# Patient Record
Sex: MALE | Race: BLACK OR AFRICAN AMERICAN | NOT HISPANIC OR LATINO | URBAN - METROPOLITAN AREA
[De-identification: names, ages, dates, MRNs, and addresses within clinical notes are randomized per-mention and may not be internally consistent; named-entity substitution may affect disease eponyms.]

---

## 2017-02-13 ENCOUNTER — EMERGENCY (EMERGENCY)
Facility: HOSPITAL | Age: 32
LOS: 0 days | Discharge: HOME | End: 2017-02-13

## 2017-06-27 DIAGNOSIS — M25.512 PAIN IN LEFT SHOULDER: ICD-10-CM

## 2019-07-31 ENCOUNTER — EMERGENCY (EMERGENCY)
Facility: HOSPITAL | Age: 34
LOS: 1 days | Discharge: HOME | End: 2019-07-31
Attending: EMERGENCY MEDICINE | Admitting: EMERGENCY MEDICINE
Payer: MEDICAID

## 2019-07-31 VITALS
WEIGHT: 125 LBS | TEMPERATURE: 98 F | HEART RATE: 90 BPM | RESPIRATION RATE: 18 BRPM | OXYGEN SATURATION: 97 % | SYSTOLIC BLOOD PRESSURE: 124 MMHG | DIASTOLIC BLOOD PRESSURE: 85 MMHG

## 2019-07-31 PROCEDURE — 99283 EMERGENCY DEPT VISIT LOW MDM: CPT

## 2019-07-31 RX ORDER — ACETAMINOPHEN 500 MG
650 TABLET ORAL ONCE
Refills: 0 | Status: COMPLETED | OUTPATIENT
Start: 2019-07-31 | End: 2019-07-31

## 2019-07-31 RX ORDER — KETOROLAC TROMETHAMINE 30 MG/ML
15 SYRINGE (ML) INJECTION ONCE
Refills: 0 | Status: DISCONTINUED | OUTPATIENT
Start: 2019-07-31 | End: 2019-07-31

## 2019-07-31 RX ADMIN — Medication 650 MILLIGRAM(S): at 17:20

## 2019-07-31 NOTE — ED PROVIDER NOTE - ENMT, MLM
Abrasion over forehead with dried blood. No skull depression palpable, no raccoon eyes, no christine signs, no laceration, Airway patent, Nasal mucosa clear. Mouth with normal mucosa. Throat has no vesicles, no oropharyngeal exudates and uvula is midline.

## 2019-07-31 NOTE — ED ADULT NURSE NOTE - CHPI ED NUR SYMPTOMS NEG
no seizure/no chest wall tenderness/no back pain/no weakness/no loss of consciousness/no blurred vision/no change in level of consciousness/no chest pain/no vomiting

## 2019-07-31 NOTE — ED PROVIDER NOTE - CHPI ED SYMPTOMS NEG
no back pain/no blurred vision/no change in level of consciousness/no loss of consciousness/no seizure/no weakness/no vomiting/no chest wall tenderness
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
## 2019-07-31 NOTE — ED PROVIDER NOTE - CLINICAL SUMMARY MEDICAL DECISION MAKING FREE TEXT BOX
No anticoagulation use, no LOC, [place] and N[facility]US negative for CT imaging, and patient's tetanus is UTD. Reassurance and return precautions for concussion given. Pain control advised.

## 2019-07-31 NOTE — ED PROVIDER NOTE - NSFOLLOWUPINSTRUCTIONS_ED_ALL_ED_FT
[facility]    Post-Concussion Syndrome  Image   Post-concussion syndrome is when symptoms last longer than normal after a head injury.    What are the signs or symptoms?  After a head injury, you may:  Have headaches.  Feel tired.  Feel dizzy.  Feel weak.  Have trouble seeing.  Have trouble in bright lights.  Have trouble hearing.  Not be able to remember things.  Not be able to focus.  Have trouble sleeping.  Have mood swings.  Have trouble learning new things.  These can last from weeks to months.    Follow these instructions at home:  Medicines     Take all medicines only as told by your doctor.  Do not take prescription pain medicines.  Activity     Limit activities as told by your doctor. This includes:  Homework.  Job-related work.  Thinking.  Watching TV.  Using a computer or phone.  Puzzles.  Exercise.  Sports.  Slowly return to your normal activity as told by your doctor.  Stop an activity if you have symptoms.  Do not do anything that may cause you to get injured again.  General instructions     Rest. Try to:  Sleep 7–9 hours each night.  Take naps or breaks when you feel tired during the day.  Do not drink alcohol until your doctor says that you can.  Keep track of your symptoms.  Keep all follow-up visits as told by your doctor. This is important.  Contact a doctor if:  You do not improve.  You get worse.  You have another injury.  Get help right away if:  You have a very bad headache.  You feel confused.  You feel very sleepy.  You pass out (faint).  You throw up (vomit).  You feel weak in any part of your body.  You feel numb in any part of your body.  You start shaking (have a seizure).  You have trouble talking.  Summary  Post-concussion syndrome is when symptoms last longer than normal after a head injury.  Limit all activity after your injury. Gradually return to normal activity as told by your doctor.  Rest, do not drink alcohol, and avoid prescription pain medicines after a concussion.  Call your doctor if your symptoms get worse.  This information is not intended to replace advice given to you by your health care provider. Make sure you discuss any questions you have with your health care provider.    Document Released: 01/25/2006 Document Revised: 01/22/2019 Document Reviewed: 01/22/2019  [employer] Patient Education © 2019 [employer]

## 2019-07-31 NOTE — ED PROVIDER NOTE - ENMT NEGATIVE STATEMENT, MLM
dried blood over forehead 2/2 abrasion with no laceration noted. Ears: no ear pain and no hearing problems.Nose: no nasal congestion and no nasal drainage.Mouth/Throat: no dysphagia, no hoarseness and no throat pain.Neck: no lumps, no pain, no stiffness and no swollen glands.
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
## 2019-08-06 DIAGNOSIS — S00.81XA ABRASION OF OTHER PART OF HEAD, INITIAL ENCOUNTER: ICD-10-CM
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
## 2023-11-05 ENCOUNTER — EMERGENCY (EMERGENCY)
Facility: HOSPITAL | Age: 38
LOS: 1 days | Discharge: HOME | End: 2023-11-05
Admitting: EMERGENCY MEDICINE
Payer: MEDICAID

## 2023-11-05 VITALS
OXYGEN SATURATION: 98 % | HEART RATE: 68 BPM | RESPIRATION RATE: 16 BRPM | TEMPERATURE: 98 F | SYSTOLIC BLOOD PRESSURE: 116 MMHG | DIASTOLIC BLOOD PRESSURE: 73 MMHG | WEIGHT: 119.93 LBS | HEIGHT: 69 IN

## 2023-11-05 DIAGNOSIS — A53.9 SYPHILIS, UNSPECIFIED: ICD-10-CM

## 2023-11-05 DIAGNOSIS — N48.5 ULCER OF PENIS: ICD-10-CM

## 2023-11-05 PROCEDURE — 99284 EMERGENCY DEPT VISIT MOD MDM: CPT

## 2023-11-05 RX ORDER — CEFTRIAXONE 500 MG/1
500 INJECTION, POWDER, FOR SOLUTION INTRAMUSCULAR; INTRAVENOUS ONCE
Refills: 0 | Status: COMPLETED | OUTPATIENT
Start: 2023-11-05 | End: 2023-11-05

## 2023-11-05 RX ORDER — AZITHROMYCIN 500 MG/1
1000 TABLET, FILM COATED ORAL ONCE
Refills: 0 | Status: COMPLETED | OUTPATIENT
Start: 2023-11-05 | End: 2023-11-05

## 2023-11-05 RX ORDER — PENICILLIN G BENZATHINE 1200000 [IU]/2ML
2.4 INJECTION, SUSPENSION INTRAMUSCULAR ONCE
Refills: 0 | Status: COMPLETED | OUTPATIENT
Start: 2023-11-05 | End: 2023-11-05

## 2023-11-05 RX ADMIN — CEFTRIAXONE 500 MILLIGRAM(S): 500 INJECTION, POWDER, FOR SOLUTION INTRAMUSCULAR; INTRAVENOUS at 22:55

## 2023-11-05 RX ADMIN — PENICILLIN G BENZATHINE 2.4 MILLION UNIT(S): 1200000 INJECTION, SUSPENSION INTRAMUSCULAR at 22:55

## 2023-11-05 RX ADMIN — AZITHROMYCIN 1000 MILLIGRAM(S): 500 TABLET, FILM COATED ORAL at 22:55

## 2023-11-05 NOTE — ED PROVIDER NOTE - PATIENT PORTAL LINK FT
You can access the FollowMyHealth Patient Portal offered by [facility] by registering at the following website: [email or web address]. By joining [employer]’s FollowMyHealth portal, you will also be able to view your health information using other applications (apps) compatible with our system.

## 2023-11-05 NOTE — ED ADULT TRIAGE NOTE - CHIEF COMPLAINT QUOTE
Pt walked into ER c/o pain with urination x2 days. Pt denies abnormal bladder movements or associated complaints at triage. NKDA/-PMH.

## 2023-11-05 NOTE — ED ADULT NURSE NOTE - OBJECTIVE STATEMENT
Patient presents with complaints of pain with urination for 2 days. H/o HIV. Denies blood in urine, fever, chills, difficulty urinating.

## 2023-11-05 NOTE — ED PROVIDER NOTE - CLINICAL SUMMARY MEDICAL DECISION MAKING FREE TEXT BOX
Patient here with painless canker sore tip of the penis asking for testing and treatment.  Physical exam is suspicious for syphilis.  We will test and treat and have patient follow-up with urology.

## 2023-11-05 NOTE — ED PROVIDER NOTE - OBJECTIVE STATEMENT
38-year-old male coming in stating he has nonpainful ulcers on his penis for the last several days.  No other complaints.  Appears well no acute distress

## 2023-11-06 PROBLEM — T14.8XXA OTHER INJURY OF UNSPECIFIED BODY REGION, INITIAL ENCOUNTER: Chronic | Status: ACTIVE | Noted: 2019-07-31
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
## 2023-11-06 LAB
C TRACH RRNA SPEC QL NAA+PROBE: SIGNIFICANT CHANGE UP
C TRACH RRNA SPEC QL NAA+PROBE: SIGNIFICANT CHANGE UP
N GONORRHOEA RRNA SPEC QL NAA+PROBE: SIGNIFICANT CHANGE UP
N GONORRHOEA RRNA SPEC QL NAA+PROBE: SIGNIFICANT CHANGE UP

## 2023-11-07 LAB
RPR SER-TITR: (no result)
RPR SER-TITR: (no result)
RPR SERPL-ACNC: REACTIVE
RPR SERPL-ACNC: REACTIVE
T PALLIDUM AB TITR SER: POSITIVE
T PALLIDUM AB TITR SER: POSITIVE

## 2024-01-22 NOTE — ED ADULT NURSE NOTE - SKIN TURGOR
[FreeTextEntry1] : Hypertension-continue lisinopril 40 mg daily.  Hyperlipidemia-he stopped taking Lipitor number months ago because concerned about side effects.  He was not experiencing any side effects. Repeat fasting labs/lipid profile were sent.  Chronic GERD/gastritis-presently on Prevacid 30 mg daily.  He does need GI follow-up for this as well as colonoscopy.  Roqrxqvxqnz-aqkrmo-lw A1c was sent.  Follow-up PSA for prostate cancer screening was sent.  History of colon polyps-we discussed the need to follow-up with his gastroenterologist because he is most likely due for follow-up study.  Sinusitis-start Omnicef 600 mg daily for 1 week. resilient/elastic

## 2024-03-28 NOTE — ED PROVIDER NOTE - EYES, MLM
Robotic assisted laparoscopic cholecystectomy Clear bilaterally, pupils equal, round and reactive to light.

## 2024-06-06 ENCOUNTER — EMERGENCY (EMERGENCY)
Facility: HOSPITAL | Age: 39
LOS: 1 days | Discharge: HOME | End: 2024-06-06
Attending: EMERGENCY MEDICINE | Admitting: EMERGENCY MEDICINE
Payer: MEDICAID

## 2024-06-06 VITALS
OXYGEN SATURATION: 98 % | WEIGHT: 119.93 LBS | RESPIRATION RATE: 16 BRPM | HEART RATE: 72 BPM | SYSTOLIC BLOOD PRESSURE: 129 MMHG | TEMPERATURE: 98 F | HEIGHT: 69 IN | DIASTOLIC BLOOD PRESSURE: 88 MMHG

## 2024-06-06 VITALS
TEMPERATURE: 98 F | DIASTOLIC BLOOD PRESSURE: 81 MMHG | HEART RATE: 69 BPM | OXYGEN SATURATION: 96 % | SYSTOLIC BLOOD PRESSURE: 125 MMHG | RESPIRATION RATE: 16 BRPM

## 2024-06-06 DIAGNOSIS — L53.9 ERYTHEMATOUS CONDITION, UNSPECIFIED: ICD-10-CM

## 2024-06-06 DIAGNOSIS — L02.31 CUTANEOUS ABSCESS OF BUTTOCK: ICD-10-CM

## 2024-06-06 PROCEDURE — 99283 EMERGENCY DEPT VISIT LOW MDM: CPT

## 2024-06-06 RX ORDER — IBUPROFEN 200 MG
600 TABLET ORAL ONCE
Refills: 0 | Status: COMPLETED | OUTPATIENT
Start: 2024-06-06 | End: 2024-06-06

## 2024-06-06 RX ORDER — CEPHALEXIN 500 MG
500 CAPSULE ORAL ONCE
Refills: 0 | Status: COMPLETED | OUTPATIENT
Start: 2024-06-06 | End: 2024-06-06

## 2024-06-06 RX ORDER — IBUPROFEN 200 MG
1 TABLET ORAL
Qty: 28 | Refills: 0
Start: 2024-06-06 | End: 2024-06-12

## 2024-06-06 RX ORDER — CEPHALEXIN 500 MG
1 CAPSULE ORAL
Qty: 14 | Refills: 0
Start: 2024-06-06 | End: 2024-06-12

## 2024-06-06 RX ADMIN — Medication 500 MILLIGRAM(S): at 05:17

## 2024-06-06 RX ADMIN — Medication 1 TABLET(S): at 05:17

## 2024-06-06 RX ADMIN — Medication 600 MILLIGRAM(S): at 05:17

## 2024-06-06 NOTE — ED PROVIDER NOTE - PROGRESS NOTE DETAILS
Patient to be started on double antibiotics the region after Emla application open at this time does not require incision and drainage requested that he have his doctor see him for wound follow-up in 48 hours or return immediately to the emergency room for evaluation

## 2024-06-06 NOTE — ED PROVIDER NOTE - SKIN WOUND TYPE
Circular discrete region of erythema on the right lateral buttock with mild tenderness to palpation no induration no fluctuance.  Lateral to this there is a another region of erythema with a central head with pustule and surrounding erythema with mild induration no fluctuance no streaking no crepitus tender to palpation

## 2024-06-06 NOTE — ED PROVIDER NOTE - OBJECTIVE STATEMENT
39-year-old male no past medical history, who presents with pain and redness to right lateral buttock which appeared 2 days ago and now worsening.  Patient suspects possible infection.  Denies trauma to that region denies shaving.  Denies fever chills or any other abscess elsewhere.  Denies history of abscesses in the past

## 2024-06-06 NOTE — ED PROVIDER NOTE - PATIENT PORTAL LINK FT
You can access the FollowMyHealth Patient Portal offered by [facility] by registering at the following website: [email or web address]. By joining [employer]’s FollowMyHealth portal, you will also be able to view your health information using other applications (apps) compatible with our system.

## 2024-06-06 NOTE — ED PROVIDER NOTE - CLINICAL SUMMARY MEDICAL DECISION MAKING FREE TEXT BOX
39-year-old male no past medical history, who presents with pain and redness to right lateral buttock which appeared 2 days ago and now worsening.  Patient suspects possible infection.  Denies trauma to that region denies shaving.  Denies fever chills or any other abscess elsewhere.  Denies history of abscesses in the past    There are 2 discrete circular regions of erythema over the right lateral buttocks 1 which is not fluctuant open not actively draining tender to palpation with no induration, the second does have a central head with very mild induration underneath no streaking no crepitus no vesicles or lesions.  With Emla applied with Tegaderm central head opened patient advised warm compresses to return in 48 hours for wound check he also has a primary care.  I do not suspect abscess elsewhere skin is otherwise intact no streaking, will start him on Bactrim and Keflex and ibuprofen with strict return precautions,

## 2024-09-23 ENCOUNTER — EMERGENCY (EMERGENCY)
Facility: HOSPITAL | Age: 39
LOS: 1 days | Discharge: LEFT BEFORE DISCHARGE | End: 2024-09-23
Attending: EMERGENCY MEDICINE | Admitting: EMERGENCY MEDICINE
Payer: MEDICAID

## 2024-09-23 VITALS
HEART RATE: 59 BPM | WEIGHT: 115.96 LBS | OXYGEN SATURATION: 99 % | RESPIRATION RATE: 16 BRPM | TEMPERATURE: 99 F | SYSTOLIC BLOOD PRESSURE: 114 MMHG | HEIGHT: 66 IN | DIASTOLIC BLOOD PRESSURE: 80 MMHG

## 2024-09-23 PROCEDURE — 99283 EMERGENCY DEPT VISIT LOW MDM: CPT

## 2024-09-23 RX ORDER — ACETAMINOPHEN 325 MG/1
650 TABLET ORAL ONCE
Refills: 0 | Status: COMPLETED | OUTPATIENT
Start: 2024-09-23 | End: 2024-09-23

## 2024-09-23 NOTE — ED PROVIDER NOTE - CLINICAL SUMMARY MEDICAL DECISION MAKING FREE TEXT BOX
Patient with no significant past medical history presents with weeks to months of right second toe pain related to overgrown toenail.  On exam, patient has onychomycosis and particularly overgrown right second toenail with tenderness to palpation of toe.  X-ray negative for fracture.  No evidence of cellulitis clinically.  Will treat pain and refer to podiatry. Patient with no significant past medical history presents with weeks to months of right second toe pain related to overgrown toenail.  On exam, patient has onychomycosis and particularly overgrown right second toenail with tenderness to palpation of toe.   No evidence of cellulitis clinically.  Will check xr, treat pain and refer to podiatry.    Pt eloped prior to xray or receiving paperwork with referral.

## 2024-09-23 NOTE — ED PROVIDER NOTE - PHYSICAL EXAMINATION
Constitutional: awake and alert, in no acute distress  HEENT: head normocephalic and atraumatic. moist mucous membranes  Eyes: extraocular movements intact, normal conjunctiva  Neck: supple, normal ROM  Cardiovascular: regular rate   Pulmonary: no respiratory distress  Gastrointestinal: abdomen flat and nondistended  Skin: warm, dry, normal for ethnicity  Musculoskeletal: Onychomycosis of all toenails, right second toe with long thick toenail, tenderness to palpation of toe, no open wounds  Neurological: oriented x4, no focal neurologic deficit.   Psychiatric: calm and cooperative

## 2024-09-23 NOTE — ED PROVIDER NOTE - OBJECTIVE STATEMENT
Patient with no significant past medical history presents with right second toe pain for weeks to months.  States that his toenail is overgrown and he does not have nail clippers at home.  Has never seen a podiatrist.  No trauma to toe.  No fevers or chills.

## 2024-09-23 NOTE — ED PROVIDER NOTE - NSFOLLOWUPINSTRUCTIONS_ED_ALL_ED_FT
Fungal Nail Infection  A fungal nail infection is a common infection of the toenails or fingernails. This condition affects toenails more often than fingernails. It often affects the great, or big, toes. More than one nail may be infected. The condition can be passed from person to person (is contagious).    What are the causes?  This condition is caused by a fungus, such as yeast or molds. Several types of fungi can cause the infection. These fungi are common in moist and warm areas. If your hands or feet come into contact with the fungus, it may get into a crack in your fingernail or toenail or in the surrounding skin, and cause an infection.    What increases the risk?  The following factors may make you more likely to develop this condition:  Being of older age.  Having certain medical conditions, such as:  Athlete's foot.  Diabetes.  Poor circulation.  A weak body defense system (immune system).  Walking barefoot in areas where the fungus thrives, such as showers or locker rooms.  Wearing shoes and socks that cause your feet to sweat.  Having a nail injury or a recent nail surgery.  What are the signs or symptoms?  Symptoms of this condition include:  A pale spot on the nail.  Thickening of the nail.  A nail that becomes yellow, brown, or white.  A brittle or ragged nail edge.  A nail that has lifted away from the nail bed.  How is this diagnosed?  This condition is diagnosed with a physical exam. Your health care provider may take a scraping or clipping from your nail to test for the fungus.    How is this treated?  Medicine being applied to a fungal nail infection.   Treatment is not needed for mild infections. If you have significant nail changes, treatment may include:  Antifungal medicines taken by mouth (orally). You may need to take the medicine for several weeks or several months, and you may not see the results for a long time. These medicines can cause side effects. Ask your health care provider what problems to watch for.  Antifungal nail polish or nail cream. These may be used along with oral antifungal medicines.  Laser treatment of the nail.  Surgery to remove the nail. This may be needed for the most severe infections.  It can take a long time, usually up to a year, for the infection to go away. The infection may also come back.    Follow these instructions at home:  Medicines    Take or apply over-the-counter and prescription medicines only as told by your health care provider.  Ask your health care provider about using over-the-counter mentholated ointment on your nails.  Nail care    Trim your nails often.  Wash and dry your hands and feet every day.  Keep your feet dry. To do this:  Wear absorbent socks, and change your socks frequently.  Wear shoes that allow air to circulate, such as sandals or canvas tennis shoes. Throw out old shoes.  If you go to a nail salon, make sure you choose one that uses clean instruments.  Use antifungal foot powder on your feet and in your shoes.  General instructions    Do not share personal items, such as towels or nail clippers.  Do not walk barefoot in shower rooms or locker rooms.  Wear rubber gloves if you are working with your hands in wet areas.  Keep all follow-up visits. This is important.  Contact a health care provider if:  You have redness, pain, or pus near the toenail or fingernail.  Your infection is not getting better, or it is getting worse after several months.  You have more circulation problems near the toenail or fingernail.  You have brown or black discoloration of the nail that spreads to the surrounding skin.  Summary  A fungal nail infection is a common infection of the toenails or fingernails.  Treatment is not needed for mild infections. If you have significant nail changes, treatment may include taking medicine orally and applying medicine to your nails.  It can take a long time, usually up to a year, for the infection to go away. The infection may also come back.  Take or apply over-the-counter and prescription medicines only as told by your health care provider.  This information is not intended to replace advice given to you by your health care provider. Make sure you discuss any questions you have with your health care provider.

## 2024-09-23 NOTE — ED PROVIDER NOTE - CARE PROVIDER_API CALL
Ty Grissom  Podiatric Medicine and Surgery  [address] [place] [address], [address]  [place] [address]  Phone: [phone]  Fax: [phone]  Follow Up Time:

## 2024-09-23 NOTE — ED ADULT NURSE NOTE - CCCP TRG CHIEF CMPLNT
O2 sats 89% and 91%on room air at rest and with walking checked . Updated Dr. Gonzáles and made orders. AVS reviewed to pt and spouse and agreed to the discharge plan. Wheeled out by aide on discharge.                          toe pain

## 2024-09-26 DIAGNOSIS — B35.1 TINEA UNGUIUM: ICD-10-CM

## 2024-09-26 DIAGNOSIS — Z53.29 PROCEDURE AND TREATMENT NOT CARRIED OUT BECAUSE OF PATIENT'S DECISION FOR OTHER REASONS: ICD-10-CM

## 2024-09-26 DIAGNOSIS — M79.674 PAIN IN RIGHT TOE(S): ICD-10-CM

## 2024-09-30 NOTE — ED PROVIDER NOTE - IV ALTEPLASE EXCL ABS HIDDEN
Evolved after procedure, as evidenced by fever 102.4, tachycardia, with suspected source of infection complicated UTI as above.  Sepsis workup ordered - WBC 10.02, procalcitonin 0.46, lactic acid 1.1  On IV cefepime and Diflucan  9/27 - Patient afebrile and no longer tachycardic   BCx-NG 72H/UCX-mixed contaminants  Gentle IV hydration  Monitor vitals for fever recurrence   show

## 2024-10-13 ENCOUNTER — EMERGENCY (EMERGENCY)
Facility: HOSPITAL | Age: 39
LOS: 1 days | Discharge: HOME | End: 2024-10-13
Attending: EMERGENCY MEDICINE | Admitting: EMERGENCY MEDICINE
Payer: MEDICAID

## 2024-10-13 VITALS
OXYGEN SATURATION: 97 % | HEART RATE: 96 BPM | HEIGHT: 66 IN | TEMPERATURE: 99 F | SYSTOLIC BLOOD PRESSURE: 145 MMHG | RESPIRATION RATE: 18 BRPM | DIASTOLIC BLOOD PRESSURE: 87 MMHG | WEIGHT: 119.93 LBS
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
## 2024-10-13 DIAGNOSIS — R07.89 OTHER CHEST PAIN: ICD-10-CM

## 2024-10-13 DIAGNOSIS — M79.671 PAIN IN RIGHT FOOT: ICD-10-CM

## 2024-10-13 DIAGNOSIS — S90.31XA CONTUSION OF RIGHT FOOT, INITIAL ENCOUNTER: ICD-10-CM
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
## 2024-10-13 PROCEDURE — 99284 EMERGENCY DEPT VISIT MOD MDM: CPT

## 2024-10-13 PROCEDURE — 73630 X-RAY EXAM OF FOOT: CPT | Mod: 26,RT

## 2024-10-13 RX ORDER — ACETAMINOPHEN 325 MG
650 TABLET ORAL ONCE
Refills: 0 | Status: COMPLETED | OUTPATIENT
Start: 2024-10-13 | End: 2024-10-13

## 2024-10-13 RX ADMIN — Medication 600 MILLIGRAM(S): at 23:03

## 2024-10-13 RX ADMIN — Medication 650 MILLIGRAM(S): at 23:03

## 2024-10-13 NOTE — ED ADULT NURSE NOTE - IN THE PAST 12 MONTHS HAVE YOU USED DRUGS OTHER THAN THOSE REQUIRED FOR MEDICAL REASON?
Last seen 2018   Needs to be evaluated for med refills   Please make appt    No Ear Star Wedge Flap Text: The defect edges were debeveled with a #15 blade scalpel.  Given the location of the defect and the proximity to free margins (helical rim) an ear star wedge flap was deemed most appropriate.  Using a sterile surgical marker, the appropriate flap was drawn incorporating the defect and placing the expected incisions between the helical rim and antihelix where possible.  The area thus outlined was incised through and through with a #15 scalpel blade.

## 2024-10-13 NOTE — ED PROVIDER NOTE - NSFOLLOWUPINSTRUCTIONS_ED_ALL_ED_FT
Contusion    A contusion is a deep bruise. Contusions are the result of a blunt injury to tissues and muscle fibers under the skin. The injury causes bleeding under the skin. The skin overlying the contusion may turn blue, purple, or yellow. Minor injuries will give you a painless contusion, but more severe contusions may stay painful and swollen for a few weeks.     CAUSES  This condition is usually caused by a blow, trauma, or direct force to an area of the body.    SYMPTOMS  Symptoms of this condition include:    Swelling of the injured area.  Pain and tenderness in the injured area.  Discoloration. The area may have redness and then turn blue, purple, or yellow.    DIAGNOSIS  This condition is diagnosed based on a physical exam and medical history. An X-ray, CT scan, or MRI may be needed to determine if there are any associated injuries, such as broken bones (fractures).    TREATMENT  Specific treatment for this condition depends on what area of the body was injured. In general, the best treatment for a contusion is resting, icing, applying pressure to (compression), and elevating the injured area. This is often called the RICE strategy. Over-the-counter anti-inflammatory medicines may also be recommended for pain control.     HOME CARE INSTRUCTIONS  Rest the injured area.   If directed, apply ice to the injured area:  Put ice in a plastic bag.  Place a towel between your skin and the bag.  Leave the ice on for 20 minutes, 2–3 times per day.  If directed, apply light compression to the injured area using an elastic bandage. Make sure the bandage is not wrapped too tightly. Remove and reapply the bandage as directed by your health care provider.  If possible, raise (elevate) the injured area above the level of your heart while you are sitting or lying down.   Take over-the-counter and prescription medicines only as told by your health care provider.    SEEK MEDICAL CARE IF:  Your symptoms do not improve after several days of treatment.  Your symptoms get worse.   You have difficulty moving the injured area.    SEEK IMMEDIATE MEDICAL CARE IF:  You have severe pain.  You have numbness in a hand or foot.   Your hand or foot turns pale or cold.    ADDITIONAL NOTES AND INSTRUCTIONS    Please follow up with your Primary MD in 24-48 hr.  Seek immediate medical care for any new/worsening signs or symptoms. No fracture was identified on your X-ray tonight. Please read all handouts provided to you from the emergency department.  Seek immediate medical attention for any new/worsening signs or symptoms.  You may take ibuprofen 600 mg every 6 hours and/or acetaminophen 650 mg every 4-6 hours as needed for pain.    To access your record on the patient portal [facility], please visit:  [email or web address]  If you are having difficulties setting this up, call [phone] and someone can assist you over the phone.     Contusion    A contusion is a deep bruise. Contusions are the result of a blunt injury to tissues and muscle fibers under the skin. The injury causes bleeding under the skin. The skin overlying the contusion may turn blue, purple, or yellow. Minor injuries will give you a painless contusion, but more severe contusions may stay painful and swollen for a few weeks.     CAUSES  This condition is usually caused by a blow, trauma, or direct force to an area of the body.    SYMPTOMS  Symptoms of this condition include:    Swelling of the injured area.  Pain and tenderness in the injured area.  Discoloration. The area may have redness and then turn blue, purple, or yellow.    DIAGNOSIS  This condition is diagnosed based on a physical exam and medical history. An X-ray, CT scan, or MRI may be needed to determine if there are any associated injuries, such as broken bones (fractures).    TREATMENT  Specific treatment for this condition depends on what area of the body was injured. In general, the best treatment for a contusion is resting, icing, applying pressure to (compression), and elevating the injured area. This is often called the RICE strategy. Over-the-counter anti-inflammatory medicines may also be recommended for pain control.     HOME CARE INSTRUCTIONS  Rest the injured area.   If directed, apply ice to the injured area:  Put ice in a plastic bag.  Place a towel between your skin and the bag.  Leave the ice on for 20 minutes, 2–3 times per day.  If directed, apply light compression to the injured area using an elastic bandage. Make sure the bandage is not wrapped too tightly. Remove and reapply the bandage as directed by your health care provider.  If possible, raise (elevate) the injured area above the level of your heart while you are sitting or lying down.   Take over-the-counter and prescription medicines only as told by your health care provider.    SEEK MEDICAL CARE IF:  Your symptoms do not improve after several days of treatment.  Your symptoms get worse.   You have difficulty moving the injured area.    SEEK IMMEDIATE MEDICAL CARE IF:  You have severe pain.  You have numbness in a hand or foot.   Your hand or foot turns pale or cold.    ADDITIONAL NOTES AND INSTRUCTIONS    Please follow up with your Primary MD in 24-48 hr.  Seek immediate medical care for any new/worsening signs or symptoms.

## 2024-10-13 NOTE — ED PROVIDER NOTE - PATIENT PORTAL LINK FT
You can access the FollowMyHealth Patient Portal offered by [facility] by registering at the following website: [email or web address]. By joining [employer]’s FollowMyHealth portal, you will also be able to view your health information using other applications (apps) compatible with our system.
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
## 2024-10-13 NOTE — ED ADULT NURSE NOTE - NSFALLUNIVINTERV_ED_ALL_ED
Bed/Stretcher in lowest position, wheels locked, appropriate side rails in place/Call bell, personal items and telephone in reach/Instruct patient to call for assistance before getting out of bed/chair/stretcher/Non-slip footwear applied when patient is off stretcher/[place] to call system/Physically safe environment - no spills, clutter or unnecessary equipment/Purposeful proactive rounding/Room/bathroom lighting operational, light cord in reach
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
## 2025-01-25 ENCOUNTER — EMERGENCY (EMERGENCY)
Facility: HOSPITAL | Age: 40
LOS: 1 days | Discharge: HOME | End: 2025-01-25
Admitting: EMERGENCY MEDICINE
Payer: MEDICAID

## 2025-01-25 VITALS
TEMPERATURE: 98 F | DIASTOLIC BLOOD PRESSURE: 81 MMHG | RESPIRATION RATE: 18 BRPM | SYSTOLIC BLOOD PRESSURE: 129 MMHG | OXYGEN SATURATION: 100 % | HEART RATE: 74 BPM

## 2025-01-25 DIAGNOSIS — K08.89 OTHER SPECIFIED DISORDERS OF TEETH AND SUPPORTING STRUCTURES: ICD-10-CM

## 2025-01-25 PROCEDURE — 99284 EMERGENCY DEPT VISIT MOD MDM: CPT

## 2025-01-25 RX ORDER — IBUPROFEN 200 MG
600 TABLET ORAL ONCE
Refills: 0 | Status: COMPLETED | OUTPATIENT
Start: 2025-01-25 | End: 2025-01-25

## 2025-01-25 RX ORDER — AMOXICILLIN/POTASSIUM CLAV 875-125 MG
875 TABLET ORAL
Qty: 20 | Refills: 0
Start: 2025-01-25 | End: 2025-02-03

## 2025-01-25 RX ORDER — OXYCODONE AND ACETAMINOPHEN 5; 325 MG/1; MG/1
1 TABLET ORAL ONCE
Refills: 0 | Status: DISCONTINUED | OUTPATIENT
Start: 2025-01-25 | End: 2025-01-25

## 2025-01-25 RX ORDER — ACETAMINOPHEN 80 MG/.8ML
650 SOLUTION/ DROPS ORAL ONCE
Refills: 0 | Status: COMPLETED | OUTPATIENT
Start: 2025-01-25 | End: 2025-01-25

## 2025-01-25 RX ORDER — AMOXICILLIN/POTASSIUM CLAV 875-125 MG
1 TABLET ORAL ONCE
Refills: 0 | Status: COMPLETED | OUTPATIENT
Start: 2025-01-25 | End: 2025-01-25

## 2025-01-25 RX ADMIN — ACETAMINOPHEN 650 MILLIGRAM(S): 80 SOLUTION/ DROPS ORAL at 19:28

## 2025-01-25 RX ADMIN — Medication 600 MILLIGRAM(S): at 19:28

## 2025-01-25 RX ADMIN — Medication 1 TABLET(S): at 19:28

## 2025-01-25 NOTE — ED PROVIDER NOTE - PATIENT PORTAL LINK FT
You can access the FollowMyHealth Patient Portal offered by [facility] by registering at the following website: [email or web address]. By joining [employer]’s FollowMyHealth portal, you will also be able to view your health information using other applications (apps) compatible with our system.

## 2025-01-25 NOTE — ED PROVIDER NOTE - OBJECTIVE STATEMENT
39-year-old male now coming in for tooth pain.  Patient states that poor chronic dentition.  Patient states that he does not like going to the dentist and does not go often.  Here for pain control.  Denies nausea, vomiting, fevers, chills.  Patient appears well no acute distress

## 2025-01-25 NOTE — ED PROVIDER NOTE - PHYSICAL EXAMINATION
Physical Exam  GEN: Awake, alert, non-toxic appearing, NCAT  EYES: PERRL, full EOMI,  ENT: External inspection normal, normal voice, no oropharyngeal ulcerations/lesions/swelling  -poor dentition with chronic dental caries. Uvula midline   HEAD: atraumatic  NECK: FROM neck, supple, no meningismus, trachea midline, no JVD  SKIN: Color normal for race, warm and dry, no rash  NEURO: Oriented x3, CN 2-12 grossly intact, normal motor, normal sensory

## 2025-01-25 NOTE — ED ADULT NURSE NOTE - SUICIDE SCREENING QUESTION 2
Quality 226: Preventive Care And Screening: Tobacco Use: Screening And Cessation Intervention: Patient screened for tobacco use and is an ex/non-smoker
Detail Level: Detailed
Quality 431: Preventive Care And Screening: Unhealthy Alcohol Use - Screening: Patient not identified as an unhealthy alcohol user when screened for unhealthy alcohol use using a systematic screening method
Quality 130: Documentation Of Current Medications In The Medical Record: Current Medications Documented
No

## 2025-01-25 NOTE — ED ADULT NURSE NOTE - NSFALLUNIVINTERV_ED_ALL_ED
Bed/Stretcher in lowest position, wheels locked, appropriate side rails in place/Call bell, personal items and telephone in reach/Instruct patient to call for assistance before getting out of bed/chair/stretcher/Non-slip footwear applied when patient is off stretcher/[place] to call system/Physically safe environment - no spills, clutter or unnecessary equipment/Purposeful proactive rounding/Room/bathroom lighting operational, light cord in reach

## 2025-01-25 NOTE — ED ADULT NURSE NOTE - OBJECTIVE STATEMENT
Pt presents to ED A&Ox4 with c/o left upper dental pain x2 days. Pt reports taking tylenol and motrin to no relief. Denies fever/chills, dizziness, LOC. Denies any injuries to area. Denies other PMH or prescribed medications.

## 2025-01-25 NOTE — ED PROVIDER NOTE - NSFOLLOWUPINSTRUCTIONS_ED_ALL_ED_FT
The [facility] is located at [address] [place] [address] ([facility]) in [place].  For Emergency Services/Urgent Care, please call [phone].   They are open 830am in the morning Monday thru Friday  Please go there at least 30 minutes to an hour early to make sure you can be seen Dental Pain    Dental pain (toothache) may be caused by many things including tooth decay (cavities or caries), abscess or infection, or trauma. If you were prescribed an antibiotic medicine, finish all of it even if you start to feel better. Rinsing your mouth with salt water or applying ice to the painful area of your face may help with the pain. Follow up with a dentist is important in ensuring good oral health and preventing the worsening of dental disease.    SEEK IMMEDIATE MEDICAL CARE IF YOU HAVE ANY OF THE FOLLOWING SYMPTOMS: unable to open your mouth, trouble breathing or swallowing, fever, or swelling of the face, neck, or jaw.    LOCATION / PHONE NUMBER    [facility] is located at [address] [place] [address] ([facility]) in [place].    For Emergency Services/Urgent Care, please call [phone].     Emergency Services/Urgent Care    Urgent care for patients with pain, excessive bleeding, swelling, oral infection and/or trauma is provided with no appointment necessary.    Patients requiring urgent care should register in the New Patient Admissions suite, located on the first floor of [facility] at [address] [place] [address] ([facility]). After registration, patients will be directed to the Urgent Care Suite on the 2nd floor for treatment. On Saturdays, report directly to the 2nd floor.      Every effort is made to see all patients presenting for urgent care in a timely manner. Due to the unpredictability of the nature of urgent care, however, some patients may not be treated at [facility] but will be referred to our affiliates, [facility] ([address], [place]) or [facility] ([address] [place] [address]), for treatment.      Hours of Operation: Urgent Care    Emergency Services/Urgent Care is available at [facility] during these hours:    MONDAY THROUGH THURSDAY:  8:30 am - 8:00 pm (on a first-come, first-served basis)    FRIDAY AND SATURDAY  8:30 am - 4:00 pm (on a first-come, first-served basis)

## 2025-03-17 ENCOUNTER — EMERGENCY (EMERGENCY)
Facility: HOSPITAL | Age: 40
LOS: 1 days | Discharge: HOME | End: 2025-03-17
Attending: EMERGENCY MEDICINE | Admitting: EMERGENCY MEDICINE
Payer: MEDICAID

## 2025-03-17 VITALS
HEART RATE: 81 BPM | TEMPERATURE: 98 F | OXYGEN SATURATION: 99 % | SYSTOLIC BLOOD PRESSURE: 114 MMHG | RESPIRATION RATE: 17 BRPM | DIASTOLIC BLOOD PRESSURE: 73 MMHG

## 2025-03-17 VITALS
SYSTOLIC BLOOD PRESSURE: 123 MMHG | HEIGHT: 69 IN | DIASTOLIC BLOOD PRESSURE: 77 MMHG | WEIGHT: 119.93 LBS | TEMPERATURE: 98 F | OXYGEN SATURATION: 99 % | RESPIRATION RATE: 18 BRPM | HEART RATE: 88 BPM

## 2025-03-17 LAB
ALBUMIN SERPL ELPH-MCNC: 3.1 G/DL — LOW (ref 3.4–5)
ALP SERPL-CCNC: 86 U/L — SIGNIFICANT CHANGE UP (ref 40–120)
ALT FLD-CCNC: 23 U/L — SIGNIFICANT CHANGE UP (ref 12–42)
ANION GAP SERPL CALC-SCNC: 1 MMOL/L — LOW (ref 9–16)
AST SERPL-CCNC: 29 U/L — SIGNIFICANT CHANGE UP (ref 15–37)
BASOPHILS # BLD AUTO: 0.02 K/UL — SIGNIFICANT CHANGE UP (ref 0–0.2)
BASOPHILS NFR BLD AUTO: 0.5 % — SIGNIFICANT CHANGE UP (ref 0–2)
BILIRUB SERPL-MCNC: 0.5 MG/DL — SIGNIFICANT CHANGE UP (ref 0.2–1.2)
BUN SERPL-MCNC: 19 MG/DL — SIGNIFICANT CHANGE UP (ref 7–23)
CALCIUM SERPL-MCNC: 8.9 MG/DL — SIGNIFICANT CHANGE UP (ref 8.5–10.5)
CHLORIDE SERPL-SCNC: 104 MMOL/L — SIGNIFICANT CHANGE UP (ref 96–108)
CO2 SERPL-SCNC: 31 MMOL/L — SIGNIFICANT CHANGE UP (ref 22–31)
CREAT SERPL-MCNC: 1.36 MG/DL — HIGH (ref 0.5–1.3)
EGFR: 67 ML/MIN/1.73M2 — SIGNIFICANT CHANGE UP
EGFR: 67 ML/MIN/1.73M2 — SIGNIFICANT CHANGE UP
EOSINOPHIL # BLD AUTO: 0.04 K/UL — SIGNIFICANT CHANGE UP (ref 0–0.5)
EOSINOPHIL NFR BLD AUTO: 1.1 % — SIGNIFICANT CHANGE UP (ref 0–6)
FLUAV AG NPH QL: SIGNIFICANT CHANGE UP
FLUBV AG NPH QL: SIGNIFICANT CHANGE UP
GLUCOSE SERPL-MCNC: 94 MG/DL — SIGNIFICANT CHANGE UP (ref 70–99)
HCT VFR BLD CALC: 38.8 % — LOW (ref 39–50)
HGB BLD-MCNC: 12.1 G/DL — LOW (ref 13–17)
IMM GRANULOCYTES # BLD AUTO: 0.03 K/UL — SIGNIFICANT CHANGE UP (ref 0–0.07)
IMM GRANULOCYTES NFR BLD AUTO: 0.8 % — SIGNIFICANT CHANGE UP (ref 0–0.9)
LIDOCAIN IGE QN: 32 U/L — SIGNIFICANT CHANGE UP (ref 16–77)
LYMPHOCYTES # BLD AUTO: 1.53 K/UL — SIGNIFICANT CHANGE UP (ref 1–3.3)
LYMPHOCYTES NFR BLD AUTO: 41.9 % — SIGNIFICANT CHANGE UP (ref 13–44)
MCHC RBC-ENTMCNC: 27.7 PG — SIGNIFICANT CHANGE UP (ref 27–34)
MCHC RBC-ENTMCNC: 31.2 G/DL — LOW (ref 32–36)
MCV RBC AUTO: 88.8 FL — SIGNIFICANT CHANGE UP (ref 80–100)
MONOCYTES # BLD AUTO: 0.4 K/UL — SIGNIFICANT CHANGE UP (ref 0–0.9)
MONOCYTES NFR BLD AUTO: 11 % — SIGNIFICANT CHANGE UP (ref 2–14)
NEUTROPHILS # BLD AUTO: 1.63 K/UL — LOW (ref 1.8–7.4)
NEUTROPHILS NFR BLD AUTO: 44.7 % — SIGNIFICANT CHANGE UP (ref 43–77)
NRBC # BLD AUTO: 0 K/UL — SIGNIFICANT CHANGE UP (ref 0–0)
NRBC # FLD: 0 K/UL — SIGNIFICANT CHANGE UP (ref 0–0)
NRBC BLD AUTO-RTO: 0 /100 WBCS — SIGNIFICANT CHANGE UP (ref 0–0)
PLATELET # BLD AUTO: 268 K/UL — SIGNIFICANT CHANGE UP (ref 150–400)
PMV BLD: 10.9 FL — SIGNIFICANT CHANGE UP (ref 7–13)
POTASSIUM SERPL-MCNC: 5.2 MMOL/L — SIGNIFICANT CHANGE UP (ref 3.5–5.3)
POTASSIUM SERPL-SCNC: 5.2 MMOL/L — SIGNIFICANT CHANGE UP (ref 3.5–5.3)
PROT SERPL-MCNC: 9.3 G/DL — HIGH (ref 6.4–8.2)
RBC # BLD: 4.37 M/UL — SIGNIFICANT CHANGE UP (ref 4.2–5.8)
RBC # FLD: 12.4 % — SIGNIFICANT CHANGE UP (ref 10.3–14.5)
RSV RNA NPH QL NAA+NON-PROBE: SIGNIFICANT CHANGE UP
SARS-COV-2 RNA SPEC QL NAA+PROBE: SIGNIFICANT CHANGE UP
SODIUM SERPL-SCNC: 136 MMOL/L — SIGNIFICANT CHANGE UP (ref 132–145)
WBC # BLD: 3.65 K/UL — LOW (ref 3.8–10.5)
WBC # FLD AUTO: 3.65 K/UL — LOW (ref 3.8–10.5)

## 2025-03-17 PROCEDURE — 71046 X-RAY EXAM CHEST 2 VIEWS: CPT | Mod: 26

## 2025-03-17 PROCEDURE — 99284 EMERGENCY DEPT VISIT MOD MDM: CPT

## 2025-03-17 RX ORDER — ONDANSETRON HCL/PF 4 MG/2 ML
1 VIAL (ML) INJECTION
Qty: 15 | Refills: 0
Start: 2025-03-17 | End: 2025-03-21

## 2025-03-17 RX ORDER — BENZONATATE 100 MG
1 CAPSULE ORAL
Qty: 15 | Refills: 0
Start: 2025-03-17 | End: 2025-03-21

## 2025-03-17 RX ORDER — ONDANSETRON HCL/PF 4 MG/2 ML
8 VIAL (ML) INJECTION ONCE
Refills: 0 | Status: COMPLETED | OUTPATIENT
Start: 2025-03-17 | End: 2025-03-17

## 2025-03-17 RX ORDER — LORATADINE 5 MG/5ML
1 SOLUTION ORAL
Qty: 14 | Refills: 0
Start: 2025-03-17 | End: 2025-03-30

## 2025-03-17 RX ADMIN — Medication 1000 MILLILITER(S): at 14:24

## 2025-03-17 RX ADMIN — Medication 8 MILLIGRAM(S): at 14:24

## 2025-03-17 NOTE — ED PROVIDER NOTE - PATIENT PORTAL LINK FT
You can access the FollowMyHealth Patient Portal offered by [facility] by registering at the following website: [email or web address]. By joining [employer]’s FollowMyHealth portal, you will also be able to view your health information using other applications (apps) compatible with our system.

## 2025-03-17 NOTE — ED ADULT NURSE NOTE - NSFALLUNIVINTERV_ED_ALL_ED
Bed/Stretcher in lowest position, wheels locked, appropriate side rails in place/Call bell, personal items and telephone in reach/Instruct patient to call for assistance before getting out of bed/chair/stretcher/Non-slip footwear applied when patient is off stretcher/[place] to call system/Physically safe environment - no spills, clutter or unnecessary equipment/Purposeful proactive rounding/Room/bathroom lighting operational, light cord in reach

## 2025-03-17 NOTE — ED PROVIDER NOTE - PHYSICAL EXAMINATION
VITAL SIGNS: I have reviewed nursing notes and confirm.  CONSTITUTIONAL: Well-developed; well-nourished; in no acute distress.  SKIN: Skin exam is warm and dry, no acute rash.  HEAD: Normocephalic; atraumatic.  EYES: PERRL, EOM intact; conjunctiva and sclera clear.  ENT: No nasal discharge; airway clear.  NECK: Supple; non tender.  CARD: RRR  RESP: Unlabored. No wheezes, rales or rhonchi.  ABD: soft; non-distended; non-tender  EXT: Normal ROM.   NEURO: Alert, oriented. Grossly unremarkable.  PSYCH: Cooperative, appropriate.

## 2025-03-17 NOTE — ED PROVIDER NOTE - OBJECTIVE STATEMENT
40-year-old male presents with nonproductive cough, nausea/vomiting nonbilious nonbloody emesis and some generalized abdominal cramping associated with vomiting all x 2 or 3 days.  No documented fever.  Has had 1 or 2 episodes of loose stool but no truly liquid diarrhea or bright red blood per rectum/melena.  No difficulty breathing.  No chest discomfort.  Endorsing some generalized fatigue and myalgias.  No travel or sick contacts.  Reports no past medical history, no surgical history, taking no meds and with no allergies.

## 2025-03-17 NOTE — ED PROVIDER NOTE - CLINICAL SUMMARY MEDICAL DECISION MAKING FREE TEXT BOX
Lungs CTA w/no consolidation on CXR, no leukocytosis or fever, soft ntnd abd on re-exam w/normal abd organ function. No vomiting in ED. Feeling improved s/p IVF/supportive care measures. Negative flu test. Likely some other kind of viral illness. Will d/c home with continued supportive care treatment. Given return precautions and anticipatory guidance.

## 2025-03-19 DIAGNOSIS — R11.2 NAUSEA WITH VOMITING, UNSPECIFIED: ICD-10-CM

## 2025-03-19 DIAGNOSIS — B34.9 VIRAL INFECTION, UNSPECIFIED: ICD-10-CM

## 2025-05-24 ENCOUNTER — EMERGENCY (EMERGENCY)
Facility: HOSPITAL | Age: 40
LOS: 1 days | End: 2025-05-24
Attending: EMERGENCY MEDICINE | Admitting: EMERGENCY MEDICINE
Payer: MEDICAID

## 2025-05-24 VITALS
DIASTOLIC BLOOD PRESSURE: 85 MMHG | HEART RATE: 71 BPM | OXYGEN SATURATION: 100 % | SYSTOLIC BLOOD PRESSURE: 131 MMHG | TEMPERATURE: 98 F | RESPIRATION RATE: 16 BRPM | HEIGHT: 69 IN | WEIGHT: 119.93 LBS

## 2025-05-24 VITALS
OXYGEN SATURATION: 100 % | DIASTOLIC BLOOD PRESSURE: 80 MMHG | HEART RATE: 76 BPM | RESPIRATION RATE: 16 BRPM | SYSTOLIC BLOOD PRESSURE: 128 MMHG

## 2025-05-24 LAB
ALBUMIN SERPL ELPH-MCNC: 3 G/DL — LOW (ref 3.4–5)
ALP SERPL-CCNC: 80 U/L — SIGNIFICANT CHANGE UP (ref 40–120)
ALT FLD-CCNC: 21 U/L — SIGNIFICANT CHANGE UP (ref 12–42)
ANION GAP SERPL CALC-SCNC: 7 MMOL/L — LOW (ref 9–16)
AST SERPL-CCNC: 20 U/L — SIGNIFICANT CHANGE UP (ref 15–37)
BASOPHILS # BLD AUTO: 0.01 K/UL — SIGNIFICANT CHANGE UP (ref 0–0.2)
BASOPHILS NFR BLD AUTO: 0.3 % — SIGNIFICANT CHANGE UP (ref 0–2)
BILIRUB SERPL-MCNC: 0.6 MG/DL — SIGNIFICANT CHANGE UP (ref 0.2–1.2)
BUN SERPL-MCNC: 17 MG/DL — SIGNIFICANT CHANGE UP (ref 7–23)
CALCIUM SERPL-MCNC: 9.1 MG/DL — SIGNIFICANT CHANGE UP (ref 8.5–10.5)
CHLORIDE SERPL-SCNC: 107 MMOL/L — SIGNIFICANT CHANGE UP (ref 96–108)
CO2 SERPL-SCNC: 28 MMOL/L — SIGNIFICANT CHANGE UP (ref 22–31)
CREAT SERPL-MCNC: 1.06 MG/DL — SIGNIFICANT CHANGE UP (ref 0.5–1.3)
EGFR: 90 ML/MIN/1.73M2 — SIGNIFICANT CHANGE UP
EGFR: 90 ML/MIN/1.73M2 — SIGNIFICANT CHANGE UP
EOSINOPHIL # BLD AUTO: 0.1 K/UL — SIGNIFICANT CHANGE UP (ref 0–0.5)
EOSINOPHIL NFR BLD AUTO: 2.8 % — SIGNIFICANT CHANGE UP (ref 0–6)
GLUCOSE SERPL-MCNC: 100 MG/DL — HIGH (ref 70–99)
HCT VFR BLD CALC: 40 % — SIGNIFICANT CHANGE UP (ref 39–50)
HGB BLD-MCNC: 12.8 G/DL — LOW (ref 13–17)
IMM GRANULOCYTES # BLD AUTO: 0 K/UL — SIGNIFICANT CHANGE UP (ref 0–0.07)
IMM GRANULOCYTES NFR BLD AUTO: 0 % — SIGNIFICANT CHANGE UP (ref 0–0.9)
INR BLD: 1.14 — SIGNIFICANT CHANGE UP (ref 0.85–1.16)
LACTATE BLDV-MCNC: 0.9 MMOL/L — SIGNIFICANT CHANGE UP (ref 0.5–2)
LIDOCAIN IGE QN: 33 U/L — SIGNIFICANT CHANGE UP (ref 16–77)
LYMPHOCYTES # BLD AUTO: 1.47 K/UL — SIGNIFICANT CHANGE UP (ref 1–3.3)
LYMPHOCYTES NFR BLD AUTO: 41.6 % — SIGNIFICANT CHANGE UP (ref 13–44)
MCHC RBC-ENTMCNC: 28.3 PG — SIGNIFICANT CHANGE UP (ref 27–34)
MCHC RBC-ENTMCNC: 32 G/DL — SIGNIFICANT CHANGE UP (ref 32–36)
MCV RBC AUTO: 88.3 FL — SIGNIFICANT CHANGE UP (ref 80–100)
MONOCYTES # BLD AUTO: 0.36 K/UL — SIGNIFICANT CHANGE UP (ref 0–0.9)
MONOCYTES NFR BLD AUTO: 10.2 % — SIGNIFICANT CHANGE UP (ref 2–14)
NEUTROPHILS # BLD AUTO: 1.59 K/UL — LOW (ref 1.8–7.4)
NEUTROPHILS NFR BLD AUTO: 45.1 % — SIGNIFICANT CHANGE UP (ref 43–77)
NRBC # BLD AUTO: 0 K/UL — SIGNIFICANT CHANGE UP (ref 0–0)
NRBC # FLD: 0 K/UL — SIGNIFICANT CHANGE UP (ref 0–0)
NRBC BLD AUTO-RTO: 0 /100 WBCS — SIGNIFICANT CHANGE UP (ref 0–0)
PLATELET # BLD AUTO: 210 K/UL — SIGNIFICANT CHANGE UP (ref 150–400)
PMV BLD: 10.8 FL — SIGNIFICANT CHANGE UP (ref 7–13)
POTASSIUM SERPL-MCNC: 4.4 MMOL/L — SIGNIFICANT CHANGE UP (ref 3.5–5.3)
POTASSIUM SERPL-SCNC: 4.4 MMOL/L — SIGNIFICANT CHANGE UP (ref 3.5–5.3)
PROT SERPL-MCNC: 8.9 G/DL — HIGH (ref 6.4–8.2)
PROTHROM AB SERPL-ACNC: 13.2 SEC — SIGNIFICANT CHANGE UP (ref 9.9–13.4)
RBC # BLD: 4.53 M/UL — SIGNIFICANT CHANGE UP (ref 4.2–5.8)
RBC # FLD: 13.6 % — SIGNIFICANT CHANGE UP (ref 10.3–14.5)
SODIUM SERPL-SCNC: 142 MMOL/L — SIGNIFICANT CHANGE UP (ref 132–145)
TROPONIN I, HIGH SENSITIVITY RESULT: 69 NG/L — SIGNIFICANT CHANGE UP
WBC # BLD: 3.53 K/UL — LOW (ref 3.8–10.5)
WBC # FLD AUTO: 3.53 K/UL — LOW (ref 3.8–10.5)

## 2025-05-24 PROCEDURE — 99284 EMERGENCY DEPT VISIT MOD MDM: CPT

## 2025-05-24 PROCEDURE — 71045 X-RAY EXAM CHEST 1 VIEW: CPT | Mod: 26

## 2025-05-24 RX ORDER — ONDANSETRON HCL/PF 4 MG/2 ML
4 VIAL (ML) INJECTION ONCE
Refills: 0 | Status: COMPLETED | OUTPATIENT
Start: 2025-05-24 | End: 2025-05-24

## 2025-05-24 RX ADMIN — Medication 1000 MILLILITER(S): at 11:07

## 2025-05-24 RX ADMIN — Medication 4 MILLIGRAM(S): at 11:07

## 2025-05-24 NOTE — ED PROVIDER NOTE - NSFOLLOWUPINSTRUCTIONS_ED_ALL_ED_FT
1. stay well hydrated  2. follow up with your primary care doctor next week  3. return to ER if your symptoms worsen or for any other concern    Chest Pain    Chest pain can be caused by many different conditions which may or may not be dangerous. Causes include heartburn, lung infections, heart attack, blood clot in lungs, skin infections, strain or damage to muscle, cartilage, or bones, etc. In addition to a history and physical examination, an electrocardiogram (ECG) or other lab tests may have been performed to determine the cause of your chest pain. Follow up with your primary care provider or with a cardiologist as instructed.     SEEK IMMEDIATE MEDICAL CARE IF YOU HAVE ANY OF THE FOLLOWING SYMPTOMS: worsening chest pain, coughing up blood, unexplained back/neck/jaw pain, severe abdominal pain, dizziness or lightheadedness, fainting, shortness of breath, sweaty or clammy skin, vomiting, or racing heart beat. These symptoms may represent a serious problem that is an emergency. Do not wait to see if the symptoms will go away. Get medical help right away. Call 911 and do not drive yourself to the hospital.

## 2025-05-24 NOTE — ED PROVIDER NOTE - ENMT, MLM
Airway patent, Nasal mucosa clear. Mouth with normal mucosa. Throat has no vesicles, no oropharyngeal exudates and uvula is midline.  no blood in naso lois pharynx

## 2025-05-24 NOTE — ED PROVIDER NOTE - NEUROLOGICAL, MLM
Online Visit    Date/Time: 9/13/2018 2:23:18 PM  To: LIZETTE RANGEL  From: JOHN KENYON  Subject:    labs are all good except  --low vitamin d, please take supplements as prescribed weekly for next 3 months  --blood sugar in diabetes range at 6.5%.please work on low carb diet and regular exercise and weight loss.recheck in 3 months,if still high We will have to start metformin 500 mg daily.      Verified Results  COMP METABOLIC PANEL WITH CBCA, LIPID PANEL AND TSH (CMP,CBCA,LIPFA,TSH) 12Sep2018 01:52PM JOHN KENYON     Test Name Result Flag Reference   WHITE BLOOD COUNT 8.1 K/mcL  4.2-11.0   RED CELL COUNT 5.04 mil/mcL  4.50-5.90   HEMOGLOBIN 14.6 g/dl  13.0-17.0   HEMATOCRIT 46.0 %  39.0-51.0   MEAN CORPUSCULAR VOLUME 91.3 fL  78.0-100.0   MEAN CORPUSCULAR HEMOGLOBIN 29.0 pg  26.0-34.0   MEAN CORPUSCULAR HGB CONC 31.7 g/dl L 32.0-36.5   RDW-CV 14.3 %  11.0-15.0   PLATELET COUNT 303 K/mcL  140-450   MANDY% 54 %     LYM% 32 %     MON% 10 %     EOS% 3 %     BASO% 0 %     MANDY ABS 4.4 K/mcL  1.8-7.7   LYM ABS 2.6 K/mcL  1.0-4.8   MON ABS 0.8 K/mcL  0.3-0.9   EOS ABS 0.2 K/mcL  0.1-0.5   BASO ABS 0.0 K/mcL  0.0-0.3   SODIUM 139 mmol/L  135-145   POTASSIUM 4.7 mmol/L  3.4-5.1   CHLORIDE 104 mmol/L     CARBON DIOXIDE 24 mmol/L  21-32   ANION GAP 16 mmol/L  10-20   GLUCOSE 83 mg/dl  65-99   BUN 13 mg/dl  6-20   CREATININE 0.80 mg/dl  0.67-1.17   GFR EST.AFRICAN AMER >90     eGFR results = or >90 mL/min/1.73m2 = Normal kidney function.   GFR EST.NONAFRI AMER >90     eGFR results = or >90 mL/min/1.73m2 = Normal kidney function.   BUN/CREATININE RATIO 16  7-25   BILIRUBIN TOTAL 0.5 mg/dl  0.2-1.0   GOT/AST 33 Units/L  <38   ALKALINE PHOSPHATASE 100 Units/L     ALBUMIN 4.1 g/dl  3.6-5.1   TOTAL PROTEIN 8.0 g/dl  6.4-8.2   GLOBULIN (CALCULATED) 3.9 g/dl  2.0-4.0   A/G RATIO 1.1  1.0-2.4   CALCIUM 9.3 mg/dl  8.4-10.2   GPT/ALT 33 Units/L  <79   FASTING STATUS 12 hrs     CHOLESTEROL 155 mg/dl  <200   Desirable             <200  Borderline High      200 to 239  High                 >=240   HDL CHOLESTEROL 41 mg/dl  >39   Low            <40  Borderline Low 40 to 49  Near Optimal   50 to 59  Optimal        >=60   TRIGLYCERIDES 147 mg/dl  <150   Normal                   <150  Borderline High          150 to 199  High                     200 to 499  Very High                >=500   LDL CHOLESTEROL (CALCULATED) 85 mg/dl  <130   OPTIMAL               <100  NEAR OPTIMAL          100-129  BORDERLINE HIGH       130-159  HIGH                  160-189  VERY HIGH             >=190   NON-HDL CHOLESTEROL 114 mg/dl     Therapeutic Target:  CHD and risk equivalents <130  Multiple risk factors    <160  0 to 1 risk factors      <190   CHOLESTEROL/HDL RATIO 3.8  <4.5   TSH 1.048 mcUnits/mL  0.350-5.000   DIFF TYPE      AUTOMATED DIFFERENTIAL   FASTING STATUS 12 hrs     NRBC 0 /100 WBC  0   PERCENT IMMATURE GRANULOCYTES 1 %     ABSOLUTE IMMATURE GRANULOCYTES 0.1 K/mcL  0-0.2     HEMOGLOBIN A1C GLYCOSYLATED [id number] 01:52PM JOHN KENYON     Test Name Result Flag Reference   HEMOGLOBIN A1C GLYH 6.5 % H 4.5-5.6   ----DIABETIC SCREENING---  NON DIABETIC                 <5.7%  INCREASED RISK                5.7-6.4%  DIAGNOSTIC FOR DIABETES      >6.4%     ----DIABETIC CONTROL---     A1C%           eAG mg/dL  6.0            126  6.5            140  7.0            154  7.5            169  8.0            183  8.5            197  9.0            212  9.5            226  10.0           240     PSA - PROSTATE SPECIFIC AG [id number] 01:52PM JOHN KENYON     Test Name Result Flag Reference   PROSTATE SPECIFIC AG 1.28 ng/ml  <4.01   SUGGESTED AGE SPECIFIC REFERENCE RANGES     AGE                NG/ML  40-49              0.01-2.50  50-59              0.01-3.50  60-69              0.01-4.50  70-79              0.01-6.50     REFERENCE: JOURNAL OF UROLOGY 155, [phone]     [employer]     VITAMIN D,25 HYDROXY [id number] 01:52PM JOHN KENYON      Test Name Result Flag Reference   VIT D,25 HYDROXY 19.4 ng/ml L 30.0-100.0   <20  ng/mL=Vitamin D deficiency  20-29  ng/mL=Vitamin D insufficiency   ng/mL=Optimal Vitamin D  >150 ng/mL=Possible toxicity     MICROALBUMIN, URINE [id number] 01:52PM JOHN KENYON     Test Name Result Flag Reference   MICROALBUMIN 0.79 mg/dl     CREATININE RANDOM URINE 171.00 mg/dl     MICROALB/CREAT RATIO 4.6 mg/g  <30       Message   pl call      Alert and oriented, no focal deficits, no motor or sensory deficits.

## 2025-05-24 NOTE — ED PROVIDER NOTE - OBJECTIVE STATEMENT
the patient is a pleasant 41 yo male who states that he had brief episode of chest pain yesterday (lasting "a minute or 2") while lying in the cough at home.   no radiation of pain to neck arm back no associated dizziness or light handedness no sweating no nv  no other episode of chest pain yesterday or today.  pt states he vomited x 1 today that had "a speck" of blood in it.  no prior history of GI bleeding no history of gastric varices no history of alcoholism no nsaid use no ho gerd     to er today for his chest pain yesterday not concerned about speck of blood    today - The patient denies the following symptoms:  headache, dizziness/lightheadedness, neck pain/neck stiffness, numbness/tingling, photophobia, change in vision/hearing/gait/mental status/speech,difficulty swallowing, focal weakness, rash, fever, chills, chest/neck/jaw/arm or back pain, palpitations, shortness of breath, diaphoresis, swelling to arm and/or legs, N/V/D, abdominal pain, abdominal distention, back pain, flank pain

## 2025-05-24 NOTE — ED PROVIDER NOTE - CLINICAL SUMMARY MEDICAL DECISION MAKING FREE TEXT BOX
the patient is a pleasant 39 yo male who states that he had brief episode of chest pain yesterday (lasting "a minute or 2") while lying in the cough at home.   no radiation of pain to neck arm back no associated dizziness or light handedness no sweating no nv  no other episode of chest pain yesterday or today.  pt states he vomited x 1 today that had "a speck" of blood in it.  no prior history of GI bleeding no history of gastric varices no history of alcoholism no nsaid use no ho gerd     iv labs med cxr   all labg ekg cxr reviewed and dw the patient

## 2025-05-24 NOTE — ED PROVIDER NOTE - PATIENT PORTAL LINK FT
You can access the FollowMyHealth Patient Portal offered by [facility] by registering at the following website: [email or web address]. By joining [employer]s FollowMyHealth portal, you will also be able to view your health information using other applications (apps) compatible with our system.

## 2025-05-26 DIAGNOSIS — R07.9 CHEST PAIN, UNSPECIFIED: ICD-10-CM
